# Patient Record
Sex: FEMALE | Race: BLACK OR AFRICAN AMERICAN | Employment: PART TIME | URBAN - METROPOLITAN AREA
[De-identification: names, ages, dates, MRNs, and addresses within clinical notes are randomized per-mention and may not be internally consistent; named-entity substitution may affect disease eponyms.]

---

## 2021-09-22 ENCOUNTER — HOSPITAL ENCOUNTER (EMERGENCY)
Facility: CLINIC | Age: 46
Discharge: HOME | End: 2021-09-22
Attending: FAMILY MEDICINE | Admitting: FAMILY MEDICINE
Payer: MEDICAID

## 2021-09-22 VITALS
DIASTOLIC BLOOD PRESSURE: 77 MMHG | WEIGHT: 178 LBS | TEMPERATURE: 97.4 F | SYSTOLIC BLOOD PRESSURE: 109 MMHG | RESPIRATION RATE: 12 BRPM | HEART RATE: 87 BPM | OXYGEN SATURATION: 100 %

## 2021-09-22 DIAGNOSIS — T23.261A PARTIAL THICKNESS BURN OF BACK OF RIGHT HAND, INITIAL ENCOUNTER: ICD-10-CM

## 2021-09-22 PROCEDURE — 99284 EMERGENCY DEPT VISIT MOD MDM: CPT | Performed by: FAMILY MEDICINE

## 2021-09-22 PROCEDURE — 16020 DRESS/DEBRID P-THICK BURN S: CPT | Performed by: FAMILY MEDICINE

## 2021-09-22 PROCEDURE — 99283 EMERGENCY DEPT VISIT LOW MDM: CPT | Mod: 25 | Performed by: FAMILY MEDICINE

## 2021-09-22 RX ORDER — LIRAGLUTIDE 6 MG/ML
1.2 INJECTION SUBCUTANEOUS DAILY
COMMUNITY

## 2021-09-22 RX ORDER — IBUPROFEN 200 MG
600 TABLET ORAL EVERY 6 HOURS PRN
Qty: 30 TABLET | Refills: 0 | Status: SHIPPED | OUTPATIENT
Start: 2021-09-22 | End: 2023-05-31

## 2021-09-22 RX ORDER — BACITRACIN ZINC 500 [USP'U]/G
OINTMENT TOPICAL 2 TIMES DAILY
Qty: 30 G | Refills: 0 | Status: SHIPPED | OUTPATIENT
Start: 2021-09-22 | End: 2023-05-31

## 2021-09-22 RX ORDER — IBUPROFEN 600 MG/1
600 TABLET, FILM COATED ORAL ONCE
Status: DISCONTINUED | OUTPATIENT
Start: 2021-09-22 | End: 2021-09-22 | Stop reason: HOSPADM

## 2021-09-22 RX ORDER — SIMVASTATIN 20 MG
20 TABLET ORAL AT BEDTIME
COMMUNITY
End: 2023-05-31

## 2021-09-22 NOTE — DISCHARGE INSTRUCTIONS
Thank you for choosing [facility].     Please closely monitor for further symptoms. Return to the Emergency Department if you develop any new or worsening signs or symptoms.    If you received any opiate pain medications or sedatives during your visit, please do not drive for at least 8 hours.     Labs, cultures or final xray interpretations may still need to be reviewed.  We will call you if your plan of care needs to be changed.    Please follow up with your primary care physician or clinic.

## 2021-09-22 NOTE — ED PROVIDER NOTES
[facility] EMERGENCY DEPARTMENT ([facility])       9/22/21  History     Chief Complaint   Patient presents with     Burn     right     The history is provided by the patient and medical records.     Lucía Lopez is a 46 year old otherwise healthy female who presents to the Emergency Department for evaluation of a burn to her right hand which she sustained on Friday (9/17). Patient accidentally spilled hot coffee on her hand. Her hand began hurting more after her blister opened. Per [facility] her last Tdap was in 2018.     History reviewed. No pertinent past medical history.    History reviewed. No pertinent surgical history.    History reviewed. No pertinent family history.    Social History     Tobacco Use     Smoking status: Never Smoker     Smokeless tobacco: Current User   Substance Use Topics     Alcohol use: Never       Current Facility-Administered Medications   Medication     ibuprofen (ADVIL/MOTRIN) tablet 600 mg     Current Outpatient Medications   Medication     bacitracin 500 UNIT/GM external ointment     ibuprofen (ADVIL/MOTRIN) 200 MG tablet     liraglutide (VICTOZA) 18 MG/3ML solution     metFORMIN (GLUCOPHAGE) 1000 MG tablet     simvastatin (ZOCOR) 20 MG tablet      No Known Allergies      I have reviewed the Medications, Allergies, Past Medical and Surgical History, and Social History in the Epic system.    Review of Systems  ROS: 14 point ROS neg other than the symptoms noted above in the HPI.      Physical Exam   BP: 109/77  Pulse: 87  Temp: 97.4  F (36.3  C)  Resp: 12  Weight: 80.7 kg (178 lb)  SpO2: 100 %      Physical Exam  Vitals and nursing note reviewed.   Constitutional:       General: She is not in acute distress.     Appearance: Normal appearance.   HENT:      Head: Normocephalic and atraumatic.      Nose: Nose normal.      Mouth/Throat:      Mouth: Mucous membranes are moist.   Cardiovascular:      Rate and Rhythm: Normal rate.   Pulmonary:      Effort: Pulmonary effort is normal.    Musculoskeletal:         General: Tenderness present.        Arms:    Skin:     General: Skin is warm and dry.   Neurological:      General: No focal deficit present.      Mental Status: She is alert.         ED Course     At 12:39 PM the patient was seen and examined by Jurgen Wilcox MD in Room ED07.        Procedures            The medical record was reviewed and interpreted.  Managed outpatient prescription medications.         No results found for this or any previous visit (from the past 24 hour(s)).  Medications   ibuprofen (ADVIL/MOTRIN) tablet 600 mg (has no administration in time range)             Assessments & Plan (with Medical Decision Making)   A 46-year-old woman who accidentally spilled hot coffee on her hand approximately 4 and half days ago, today blister fell off and she experienced increasing pain.  She has evidence of both first-degree and superficial second-degree burn to dorsum of the right hand but not overlying any joints and with no signs of infection or other complication.  Her tetanus immunization status is up-to-date.  The area was dressed with bacitracin and she is given prescriptions for bacitracin and ibuprofen as well as wound care instructions.  We discussed the indications for emergency department return and follow-up.  Stable for discharge.      I have reviewed the nursing notes.    I have reviewed the findings, diagnosis, plan and need for follow up with the patient.    New Prescriptions    BACITRACIN 500 UNIT/GM EXTERNAL OINTMENT    Apply topically 2 times daily    IBUPROFEN (ADVIL/MOTRIN) 200 MG TABLET    Take 3 tablets (600 mg) by mouth every 6 hours as needed for mild pain       Final diagnoses:   Partial thickness burn of back of right hand, initial encounter       Sara LICONA am serving as a trained medical scribe to document services personally performed by Jurgen Wilcox MD, based on the provider's statements to me.      IJurgen MD, was physically  present and have reviewed and verified the accuracy of this note documented by Sara Garcia.     Jurgen Wilcox  9/22/2021   [facility] EMERGENCY DEPARTMENT     Jurgen Wilcox MD  09/22/21 1901

## 2021-09-22 NOTE — LETTER
September 22, 2021      To Whom It May Concern:      Lucía Lopze was seen in our Emergency Department today, 09/22/21.  I expect her condition to improve over the next 1-2 days.  She may return to work/school when improved.    Sincerely,        Jurgen Wilcox MD

## 2021-09-22 NOTE — ED TRIAGE NOTES
Pt was warming coffee at home; poured hot coffee on hand accidentally (injury occurred 9/17); Pt has taken topical gel/aloe for burns

## 2022-11-01 NOTE — TELEPHONE ENCOUNTER
Action 11/1/22 Simpson  Phone #[phone]   Action Taken Called Simpson to push CT Chest    Resolved images

## 2022-11-02 ENCOUNTER — PRE VISIT (OUTPATIENT)
Dept: CARDIOLOGY | Facility: CLINIC | Age: 47
End: 2022-11-02

## 2022-11-02 ENCOUNTER — OFFICE VISIT (OUTPATIENT)
Dept: CARDIOLOGY | Facility: CLINIC | Age: 47
End: 2022-11-02
Attending: INTERNAL MEDICINE
Payer: MEDICAID

## 2022-11-02 VITALS
OXYGEN SATURATION: 99 % | BODY MASS INDEX: 31.47 KG/M2 | DIASTOLIC BLOOD PRESSURE: 85 MMHG | HEART RATE: 90 BPM | SYSTOLIC BLOOD PRESSURE: 125 MMHG | WEIGHT: 171 LBS | HEIGHT: 62 IN

## 2022-11-02 DIAGNOSIS — E78.2 MIXED HYPERLIPIDEMIA: ICD-10-CM

## 2022-11-02 DIAGNOSIS — I25.2 HISTORY OF HEART ATTACK: Primary | ICD-10-CM

## 2022-11-02 DIAGNOSIS — I25.10 CORONARY ARTERY DISEASE INVOLVING NATIVE CORONARY ARTERY OF NATIVE HEART WITHOUT ANGINA PECTORIS: ICD-10-CM

## 2022-11-02 DIAGNOSIS — R06.09 DYSPNEA ON EXERTION: ICD-10-CM

## 2022-11-02 DIAGNOSIS — E11.59 TYPE 2 DIABETES MELLITUS WITH OTHER CIRCULATORY COMPLICATION, WITHOUT LONG-TERM CURRENT USE OF INSULIN (H): ICD-10-CM

## 2022-11-02 LAB
ATRIAL RATE - MUSE: 90 BPM
DIASTOLIC BLOOD PRESSURE - MUSE: NORMAL MMHG
INTERPRETATION ECG - MUSE: NORMAL
P AXIS - MUSE: 65 DEGREES
PR INTERVAL - MUSE: 144 MS
QRS DURATION - MUSE: 76 MS
QT - MUSE: 354 MS
QTC - MUSE: 433 MS
R AXIS - MUSE: 60 DEGREES
SYSTOLIC BLOOD PRESSURE - MUSE: NORMAL MMHG
T AXIS - MUSE: 39 DEGREES
VENTRICULAR RATE- MUSE: 90 BPM

## 2022-11-02 PROCEDURE — G0463 HOSPITAL OUTPT CLINIC VISIT: HCPCS | Mod: 25

## 2022-11-02 PROCEDURE — 93005 ELECTROCARDIOGRAM TRACING: CPT

## 2022-11-02 PROCEDURE — 99204 OFFICE O/P NEW MOD 45 MIN: CPT | Performed by: INTERNAL MEDICINE

## 2022-11-02 ASSESSMENT — PAIN SCALES - GENERAL: PAINLEVEL: MILD PAIN (3)

## 2022-11-02 NOTE — LETTER
11/2/2022      RE: Lucía Lopez  [address] [place] [address]       Dear Colleague,    Thank you for the opportunity to participate in the care of your patient, Lucía Lopez, at the [facility]. Please see a copy of my visit note below.    I am delighted to see Lucía Lopez in consultation.The primary encounter diagnosis was History of heart attack. Diagnoses of Type 2 diabetes mellitus with other circulatory complication, without long-term current use of insulin (H), Coronary artery disease involving native coronary artery of native heart without angina pectoris, Mixed hyperlipidemia, and Dyspnea on exertion were also pertinent to this visit.   As you know, the patient is a 47 year old -American female. She   has a past medical history of Coronary artery disease, Diabetes (H), Hyperlipidemia, Obese, and Shortness of breath..    On this visit, the patient states that she has dyspnea with limited exercise.  The patient denies chest pressure/discomfort, palpitations, near-syncope, syncope, orthopnea, paroxysmal nocturnal dyspnea and lower extermity edema.    The patient's cardiovascular risk factors include known cardiac disease, high cholesterol, positive family history and diabetes mellitus.    The following portions of the patient's history were reviewed and updated as appropriate: allergies, current medications, past family history, past medical history, past social history, past surgical history, and the problem list.    PMH: The patient's past medical history includes:    Past Medical History:   Diagnosis Date     Coronary artery disease      Diabetes (H)      Hyperlipidemia      Obese      Shortness of breath       No past surgical history on file.    The patient's medications as of the current encounter are:     Current Outpatient Medications   Medication Sig Dispense Refill     aspirin (ASA) 81 MG EC tablet  Take 1 tablet (81 mg) by mouth daily 90 tablet 3     bacitracin 500 UNIT/GM external ointment Apply topically 2 times daily 30 g 0     cholecalciferol 50 MCG (2000 UT) CAPS Take 1 capsule by mouth daily       ibuprofen (ADVIL/MOTRIN) 200 MG tablet Take 3 tablets (600 mg) by mouth every 6 hours as needed for mild pain 30 tablet 0     liraglutide (VICTOZA) 18 MG/3ML solution Inject 1.2 mg Subcutaneous daily       metFORMIN (GLUCOPHAGE) 1000 MG tablet Take 1,000 mg by mouth 2 times daily (with meals)       simvastatin (ZOCOR) 20 MG tablet Take 20 mg by mouth At Bedtime       vitamin B-Complex Take 1 tablet by mouth daily         Labs:     No results found for any previous visit.       Allergies:  No Known Allergies    Family History:   Family History   Problem Relation Age of Onset     Diabetes Mother      Coronary Artery Disease Brother      Coronary Artery Disease Brother      Coronary Artery Disease Brother      Coronary Artery Disease Brother      No Known Problems Brother      Coronary Artery Disease Sister      Coronary Artery Disease Sister      Coronary Artery Disease Sister      No Known Problems Sister      No Known Problems Sister      No Known Problems Son      No Known Problems Son      No Known Problems Son      No Known Problems Daughter        Psychosocial history:  reports that she has been smoking cigarettes. She has never used smokeless tobacco. She reports that she does not drink alcohol and does not use drugs.    Review of systems: negative for, palpitations, exertional chest pain or pressure, paroxysmal nocturnal dyspnea, orthopnea, lower extremity edema, syncope or near-syncope and claudication    In addition,   General: No change in weight, sleep or appetite.  Normal energy.  No fever or chills  Eyes: Negative for vision changes or eye problems  ENT: No problems with ears, nose or throat.  No difficulty swallowing.  Resp: No coughing, wheezing or shortness of breath  GI: No nausea, vomiting,   "heartburn, abdominal pain, diarrhea, constipation or change in bowel habits  : No urinary frequency or dysuria, bladder or kidney problems  Musculoskeletal: No significant muscle or joint pains  Neurologic: No headaches, numbness, tingling, weakness, problems with balance or coordination  Psychiatric: No problems with anxiety, depression or mental health  Heme/immune/allergy: No history of bleeding or clotting problems or anemia.    Endocrine: Diabetes  Skin: No rashes,worrisome lesions or skin problems  Vascular:  No claudication, lifestyle limiting or otherwise; no ischemic rest pain; no non-healing ulcers. No weakness, No loss of sensation        Physical examination  Vitals: /85 (BP Location: Right arm, Patient Position: Supine, Cuff Size: Adult Large)   Pulse 90   Ht 1.565 m (5' 1.61\")   Wt 77.6 kg (171 lb)   SpO2 99%   BMI 31.67 kg/m    BMI= Body mass index is 31.67 kg/m .    In general, the patient is a pleasant female in no apparent distress.    HEENT: Normiocephalic and atraumatic.  PERRLA.  EOMI.  Sclerae white, not injected.  Nares clear.  Pharynx without erythema or exudate.  Dentition intact.    Neck: No adenopathy.  No thyromegaly. Carotids +2/2 bilaterally without bruits.  No jugular venous distension.   Heart:  The PMI is in the 5th ICS in the midclavicular line. There is no heave. Regular rate and rhythm. Normal S1, S2 splits physiologically. No murmur, rub, click, or gallop.    Lungs: Clear to asculation.  No ronchi, wheezes, rales.  No dullness to percussion.   Abdomen: Soft, nontender, nondistended. No organomegaly. No AAA.  No bruits.   Extremities: No clubbing, cyanosis, or edema. The pulses were intact bilaterally.   Neurological: The neurological examination reveal a patient who was oriented to person, place, and time.  The remainder of the examination was nonfocal.    Cardiac tests include:    Chest CT - significant coronary calcification  ECG - NSR, LAE, normal axis, intervals, " no Qs    Assessment and Plan    1. CAD/dyspnea - plan dobutamine echo  - add ASA  2. HL - on statin  3. DM - on GLP1 agonist and metformin    The patient is to return  In 1 month. The patient understood the treatment plan as outlined above.  Patient speaks Surinamese.  An  was provided.      Ky Sandoval MD

## 2022-11-02 NOTE — PROGRESS NOTES
I am delighted to see Lucía Lopez in consultation.The primary encounter diagnosis was History of heart attack. Diagnoses of Type 2 diabetes mellitus with other circulatory complication, without long-term current use of insulin (H), Coronary artery disease involving native coronary artery of native heart without angina pectoris, Mixed hyperlipidemia, and Dyspnea on exertion were also pertinent to this visit.   As you know, the patient is a 47 year old -American female. She   has a past medical history of Coronary artery disease, Diabetes (H), Hyperlipidemia, Obese, and Shortness of breath..    On this visit, the patient states that she has dyspnea with limited exercise.  The patient denies chest pressure/discomfort, palpitations, near-syncope, syncope, orthopnea, paroxysmal nocturnal dyspnea and lower extermity edema.    The patient's cardiovascular risk factors include known cardiac disease, high cholesterol, positive family history and diabetes mellitus.    The following portions of the patient's history were reviewed and updated as appropriate: allergies, current medications, past family history, past medical history, past social history, past surgical history, and the problem list.    PMH: The patient's past medical history includes:    Past Medical History:   Diagnosis Date     Coronary artery disease      Diabetes (H)      Hyperlipidemia      Obese      Shortness of breath       No past surgical history on file.    The patient's medications as of the current encounter are:     Current Outpatient Medications   Medication Sig Dispense Refill     aspirin (ASA) 81 MG EC tablet Take 1 tablet (81 mg) by mouth daily 90 tablet 3     bacitracin 500 UNIT/GM external ointment Apply topically 2 times daily 30 g 0     cholecalciferol 50 MCG (2000 UT) CAPS Take 1 capsule by mouth daily       ibuprofen (ADVIL/MOTRIN) 200 MG tablet Take 3 tablets (600 mg) by mouth every 6 hours as needed for mild pain 30 tablet 0      liraglutide (VICTOZA) 18 MG/3ML solution Inject 1.2 mg Subcutaneous daily       metFORMIN (GLUCOPHAGE) 1000 MG tablet Take 1,000 mg by mouth 2 times daily (with meals)       simvastatin (ZOCOR) 20 MG tablet Take 20 mg by mouth At Bedtime       vitamin B-Complex Take 1 tablet by mouth daily         Labs:     No results found for any previous visit.       Allergies:  No Known Allergies    Family History:   Family History   Problem Relation Age of Onset     Diabetes Mother      Coronary Artery Disease Brother      Coronary Artery Disease Brother      Coronary Artery Disease Brother      Coronary Artery Disease Brother      No Known Problems Brother      Coronary Artery Disease Sister      Coronary Artery Disease Sister      Coronary Artery Disease Sister      No Known Problems Sister      No Known Problems Sister      No Known Problems Son      No Known Problems Son      No Known Problems Son      No Known Problems Daughter        Psychosocial history:  reports that she has been smoking cigarettes. She has never used smokeless tobacco. She reports that she does not drink alcohol and does not use drugs.    Review of systems: negative for, palpitations, exertional chest pain or pressure, paroxysmal nocturnal dyspnea, orthopnea, lower extremity edema, syncope or near-syncope and claudication    In addition,   General: No change in weight, sleep or appetite.  Normal energy.  No fever or chills  Eyes: Negative for vision changes or eye problems  ENT: No problems with ears, nose or throat.  No difficulty swallowing.  Resp: No coughing, wheezing or shortness of breath  GI: No nausea, vomiting,  heartburn, abdominal pain, diarrhea, constipation or change in bowel habits  : No urinary frequency or dysuria, bladder or kidney problems  Musculoskeletal: No significant muscle or joint pains  Neurologic: No headaches, numbness, tingling, weakness, problems with balance or coordination  Psychiatric: No problems with anxiety,  "depression or mental health  Heme/immune/allergy: No history of bleeding or clotting problems or anemia.    Endocrine: Diabetes  Skin: No rashes,worrisome lesions or skin problems  Vascular:  No claudication, lifestyle limiting or otherwise; no ischemic rest pain; no non-healing ulcers. No weakness, No loss of sensation        Physical examination  Vitals: /85 (BP Location: Right arm, Patient Position: Supine, Cuff Size: Adult Large)   Pulse 90   Ht 1.565 m (5' 1.61\")   Wt 77.6 kg (171 lb)   SpO2 99%   BMI 31.67 kg/m    BMI= Body mass index is 31.67 kg/m .    In general, the patient is a pleasant female in no apparent distress.    HEENT: Normiocephalic and atraumatic.  PERRLA.  EOMI.  Sclerae white, not injected.  Nares clear.  Pharynx without erythema or exudate.  Dentition intact.    Neck: No adenopathy.  No thyromegaly. Carotids +2/2 bilaterally without bruits.  No jugular venous distension.   Heart:  The PMI is in the 5th ICS in the midclavicular line. There is no heave. Regular rate and rhythm. Normal S1, S2 splits physiologically. No murmur, rub, click, or gallop.    Lungs: Clear to asculation.  No ronchi, wheezes, rales.  No dullness to percussion.   Abdomen: Soft, nontender, nondistended. No organomegaly. No AAA.  No bruits.   Extremities: No clubbing, cyanosis, or edema. The pulses were intact bilaterally.   Neurological: The neurological examination reveal a patient who was oriented to person, place, and time.  The remainder of the examination was nonfocal.    Cardiac tests include:    Chest CT - significant coronary calcification  ECG - NSR, LAE, normal axis, intervals, no Qs    Assessment and Plan    1. CAD/dyspnea - plan dobutamine echo  - add ASA  2. HL - on statin  3. DM - on GLP1 agonist and metformin    The patient is to return  In 1 month. The patient understood the treatment plan as outlined above.  Patient speaks Norwegian.  An  was provided.      Ky Sandoval MD     "

## 2022-11-02 NOTE — PATIENT INSTRUCTIONS
"Cardiology Providers you saw during your visit:  Dr. Sandoval    Medication changes: None    Follow up:   Dobutamine stress echo  Follow up with Dr. Sandoval in 1 month  Nutrition referral ordered, they will contact you for scheduling    INSTRUCTIONS FOR DOBUTAMINE STRESS ECHO:     Nothing to eat or drink for 3 hours before test except for water.   No caffeine, tobacco, or alcohol for 12 hrs before test.     If you have any questions, contact  Jaylan Wilkerson RN. We are encouraging the use of RABBL to communicate with your HealthCare Provider     To contact the [facility], please call, [phone]  To schedule an appointment or to leave a message for your Care Team Press #1  If you are a physician calling for another physician Press #2  For Billing Press #3  For Medical Records Press #4\"    "

## 2022-11-02 NOTE — NURSING NOTE
Chief Complaint   Patient presents with     New Patient     New Cardiology- ED follow up possible hx of heart attack     Coronary Artery Disease     Hyperlipidemia     Diabetes     Vitals were taken, medications reconciled, and EKG was performed.    JAIR Huerta  7:27 AM

## 2022-11-02 NOTE — NURSING NOTE
No medication changes today.     Plan for Dobutamine stress echo. Procedure instructions reviewed with pt using  services, questions answered.     Follow up with Dr. Sandoval in 1 month to discuss results.     Nutrition referral ordered.     Jaylan Wilkerson RN   Cardiology Nurse Coordinator

## 2022-11-04 ENCOUNTER — HOSPITAL ENCOUNTER (OUTPATIENT)
Dept: CARDIOLOGY | Facility: CLINIC | Age: 47
Discharge: HOME | End: 2022-11-04
Attending: INTERNAL MEDICINE | Admitting: INTERNAL MEDICINE
Payer: MEDICAID

## 2022-11-04 VITALS — DIASTOLIC BLOOD PRESSURE: 75 MMHG | SYSTOLIC BLOOD PRESSURE: 132 MMHG

## 2022-11-04 DIAGNOSIS — E11.59 TYPE 2 DIABETES MELLITUS WITH OTHER CIRCULATORY COMPLICATION, WITHOUT LONG-TERM CURRENT USE OF INSULIN (H): ICD-10-CM

## 2022-11-04 DIAGNOSIS — I25.10 CORONARY ARTERY DISEASE INVOLVING NATIVE CORONARY ARTERY OF NATIVE HEART WITHOUT ANGINA PECTORIS: ICD-10-CM

## 2022-11-04 DIAGNOSIS — R06.09 DYSPNEA ON EXERTION: ICD-10-CM

## 2022-11-04 DIAGNOSIS — E78.2 MIXED HYPERLIPIDEMIA: ICD-10-CM

## 2022-11-04 PROCEDURE — 258N000003 HC RX IP 258 OP 636: Performed by: INTERNAL MEDICINE

## 2022-11-04 PROCEDURE — 93350 STRESS TTE ONLY: CPT | Mod: 26 | Performed by: INTERNAL MEDICINE

## 2022-11-04 PROCEDURE — 93018 CV STRESS TEST I&R ONLY: CPT | Performed by: INTERNAL MEDICINE

## 2022-11-04 PROCEDURE — 93321 DOPPLER ECHO F-UP/LMTD STD: CPT | Mod: TC

## 2022-11-04 PROCEDURE — 250N000011 HC RX IP 250 OP 636: Performed by: INTERNAL MEDICINE

## 2022-11-04 PROCEDURE — 250N000009 HC RX 250: Performed by: INTERNAL MEDICINE

## 2022-11-04 PROCEDURE — 999N000208 ECHO STRESS ECHOCARDIOGRAM

## 2022-11-04 PROCEDURE — 93016 CV STRESS TEST SUPVJ ONLY: CPT | Performed by: INTERNAL MEDICINE

## 2022-11-04 PROCEDURE — 93325 DOPPLER ECHO COLOR FLOW MAPG: CPT | Mod: 26 | Performed by: INTERNAL MEDICINE

## 2022-11-04 PROCEDURE — 93321 DOPPLER ECHO F-UP/LMTD STD: CPT | Mod: 26 | Performed by: INTERNAL MEDICINE

## 2022-11-04 PROCEDURE — 255N000002 HC RX 255 OP 636: Performed by: INTERNAL MEDICINE

## 2022-11-04 RX ORDER — ATROPINE SULFATE 0.4 MG/ML
.2-2 AMPUL (ML) INJECTION
Status: COMPLETED | OUTPATIENT
Start: 2022-11-04 | End: 2022-11-04

## 2022-11-04 RX ORDER — SODIUM CHLORIDE 9 MG/ML
INJECTION, SOLUTION INTRAVENOUS CONTINUOUS
Status: ACTIVE | OUTPATIENT
Start: 2022-11-04 | End: 2022-11-04

## 2022-11-04 RX ORDER — DOBUTAMINE HYDROCHLORIDE 200 MG/100ML
10-50 INJECTION INTRAVENOUS CONTINUOUS
Status: ACTIVE | OUTPATIENT
Start: 2022-11-04 | End: 2022-11-04

## 2022-11-04 RX ORDER — METOPROLOL TARTRATE 1 MG/ML
1-20 INJECTION, SOLUTION INTRAVENOUS
Status: ACTIVE | OUTPATIENT
Start: 2022-11-04 | End: 2022-11-04

## 2022-11-04 RX ADMIN — PERFLUTREN 7 ML: 6.52 INJECTION, SUSPENSION INTRAVENOUS at 14:39

## 2022-11-04 RX ADMIN — DOBUTAMINE 10 MCG/KG/MIN: 12.5 INJECTION, SOLUTION, CONCENTRATE INTRAVENOUS at 14:27

## 2022-11-04 RX ADMIN — ATROPINE SULFATE 0.8 MG: 0.4 INJECTION, SOLUTION INTRAVENOUS at 14:35

## 2022-11-04 RX ADMIN — METOPROLOL TARTRATE 5 MG: 5 INJECTION INTRAVENOUS at 14:38

## 2022-11-04 NOTE — PROGRESS NOTES
Pt here for dobutamine stress test.  Test, meds and side effects reviewed with patient through iPad [place] .  Achieved target HR at 30 mcg Dobutamine and a total of 0.8 mg IV atropine.  Gave a total of 5 mg IV Metoprolol to bring HR back to baseline.  Post monitoring complete and VSS.  Pt escorted out to the gold waiting room.

## 2022-11-04 NOTE — LETTER
2022      Yasmine Lopez  [address] [place] [address]        Dear ,    We are writing to inform you of your test results.    Your test results fall within the expected range(s) or remain unchanged from previous results.  Please continue with current treatment plan.    Resulted Orders   Dobutamine Stress Echocardiogram    Narrative    [id number]  [id number]  [id number]  [id number]^RAMIN^TORRES^ANISHA     [facility]  Echocardiography Laboratory  [address] [place] [id number]     Name: YASMINE LOPEZ  MRN: [id number]  : 1975  Study Date: 2022 02:07 PM  Age: 47 yrs  Gender: Female  Patient Location: [facility]  Reason For Study: Type 2 diabetes mellitus with other circulatory  complication, wi  Ordering Physician: TORRES FAUSTIN  Referring Physician: TORRES FAUSTIN  Performed By: Kenna Canseco     BSA: 1.8 m2  Height: 61 in  Weight: 171 lb  HR: 80  BP: 160/89 mmHg  ______________________________________________________________________________  Procedure  Stress Echo Dobutamine Adult. Contrast Definity.  ______________________________________________________________________________  Interpretation Summary  Normal, low-risk dobutamine echocardiogram.  The target heart rate was achieved. Normal heart rate and BP response to  dobutamine.  Normal biventricular size, thickness, and global systolic function at  baseline, LVEF=55-60%.  With dobutamine, LVEF increased to >70% and LV cavity size decreased  appropriately.  No regional wall motion abnormalities are present at rest or with dobutamine.  No angina was elicited.  No ECG evidence of ischemia.  No significant valvular abnormalities are noted on screening Doppler exam.  The aortic root and visualized ascending aorta are normal.  ______________________________________________________________________________  Stress  Target Heart Rate was achieved.  The patient did not exhibit any symptoms during  drug infusion.  The maximum dose of dobutamine was 30mcg/kg/min.  The maximum dose of atropine was 0.8mg.  The maximum dose of metoprolol was 5mg.     Stress Results                                       Maximum Predicted HR:   171 bpm             Target HR: 145 bpm        % Maximum Predicted HR: 87 %                           Stage DurationHeart Rate  BP   Dose                               (mm:ss)   (bpm)                      baseline  0:00      80    160/89 0.00                        peak    8:52      148   147/7530.00                          Stress Duration:   8:52 mm:ss                       Maximum Stress HR: 148 bpm     Contrast  Definity (NDC #30620-527-63) given intravenously. Patient was given 7ml  mixture of 1.5ml Definity and 8.5ml saline. 3 ml wasted. Definity Expiration  22 . Definity Lot # 1322 . IV start location R Forearm .  ______________________________________________________________________________  MMode/2D Measurements & Calculations  asc Aorta Diam: 2.4 cm     Doppler Measurements & Calculations  TR max leonardo: 191.1 cm/sec  TR max P.6 mmHg     ______________________________________________________________________________  Report approved by: Carlos HUNYH 2022 03:03 PM             If you have any questions or concerns, please call the clinic at [phone], option 1.       Sincerely,      Ky Sandoval MD

## 2022-11-21 ENCOUNTER — TELEPHONE (OUTPATIENT)
Dept: FAMILY MEDICINE | Facility: CLINIC | Age: 47
End: 2022-11-21

## 2022-11-21 NOTE — TELEPHONE ENCOUNTER
General Call    Contacts       Type Contact Phone/Fax    11/21/2022 12:53 PM CST Phone (Incoming) Lucía Lopez (Self) [phone] (H)        Reason for Call:  Pt calling because she has an appt on nutritional RN on 11/23/22 and does not know if her insurance will cover this appt.      What are your questions or concerns:  Pt attempted to call but was told only her clinic or provider could call     TC has attempted x 2 to call - wait times are very long.      Date of last appointment with provider: 11/3/22    Okay to leave a detailed message?: Yes at Home number on file [phone] (home)    Call taken on 11/21/22 at noon by МАРИНА Perez

## 2022-12-06 ENCOUNTER — OFFICE VISIT (OUTPATIENT)
Dept: CARDIOLOGY | Facility: CLINIC | Age: 47
End: 2022-12-06
Attending: INTERNAL MEDICINE
Payer: MEDICAID

## 2022-12-06 VITALS
WEIGHT: 166.2 LBS | HEART RATE: 81 BPM | DIASTOLIC BLOOD PRESSURE: 84 MMHG | SYSTOLIC BLOOD PRESSURE: 125 MMHG | HEIGHT: 60 IN | OXYGEN SATURATION: 100 % | BODY MASS INDEX: 32.63 KG/M2

## 2022-12-06 DIAGNOSIS — E78.2 MIXED HYPERLIPIDEMIA: ICD-10-CM

## 2022-12-06 DIAGNOSIS — I25.10 CORONARY ARTERY DISEASE INVOLVING NATIVE CORONARY ARTERY OF NATIVE HEART WITHOUT ANGINA PECTORIS: ICD-10-CM

## 2022-12-06 DIAGNOSIS — R29.898 WEAKNESS OF LEFT ARM: Primary | ICD-10-CM

## 2022-12-06 DIAGNOSIS — R06.09 DYSPNEA ON EXERTION: ICD-10-CM

## 2022-12-06 DIAGNOSIS — E11.59 TYPE 2 DIABETES MELLITUS WITH OTHER CIRCULATORY COMPLICATION, WITHOUT LONG-TERM CURRENT USE OF INSULIN (H): ICD-10-CM

## 2022-12-06 PROCEDURE — G0463 HOSPITAL OUTPT CLINIC VISIT: HCPCS

## 2022-12-06 PROCEDURE — 99213 OFFICE O/P EST LOW 20 MIN: CPT | Performed by: INTERNAL MEDICINE

## 2022-12-06 RX ORDER — ACETAMINOPHEN 325 MG/1
325-650 TABLET ORAL EVERY 6 HOURS PRN
COMMUNITY

## 2022-12-06 ASSESSMENT — PAIN SCALES - GENERAL: PAINLEVEL: NO PAIN (0)

## 2022-12-06 NOTE — PROGRESS NOTES
I am delighted to see Lucía Lopez in consultation.The primary encounter diagnosis was Weakness of left arm. Diagnoses of Type 2 diabetes mellitus with other circulatory complication, without long-term current use of insulin (H), Coronary artery disease involving native coronary artery of native heart without angina pectoris, Mixed hyperlipidemia, and Dyspnea on exertion were also pertinent to this visit.   As you know, the patient is a 47 year old -American female. She   has a past medical history of Coronary artery disease, Diabetes (H), Hyperlipidemia, Obese, and Shortness of breath..    On this visit, the patient states that she has left arm weakness that is intermittent.  The patient denies chest pressure/discomfort, dyspnea, palpitations, near-syncope, syncope, orthopnea, paroxysmal nocturnal dyspnea and lower extermity edema.    The patient's cardiovascular risk factors include known cardiac disease, high cholesterol and diabetes mellitus.    The following portions of the patient's history were reviewed and updated as appropriate: allergies, current medications, past family history, past medical history, past social history, past surgical history, and the problem list.    PMH: The patient's past medical history includes:    Past Medical History:   Diagnosis Date     Coronary artery disease      Diabetes (H)      Hyperlipidemia      Obese      Shortness of breath       History reviewed. No pertinent surgical history.    The patient's medications as of the current encounter are:     Current Outpatient Medications   Medication Sig Dispense Refill     acetaminophen (TYLENOL) 325 MG tablet Take 325-650 mg by mouth every 6 hours as needed for mild pain       aspirin (ASA) 81 MG EC tablet Take 1 tablet (81 mg) by mouth daily 90 tablet 3     cholecalciferol 50 MCG (2000 UT) CAPS Take 1 capsule by mouth daily       liraglutide (VICTOZA) 18 MG/3ML solution Inject 1.2 mg Subcutaneous daily       metFORMIN  (GLUCOPHAGE) 1000 MG tablet Take 1,000 mg by mouth 2 times daily (with meals)       simvastatin (ZOCOR) 20 MG tablet Take 20 mg by mouth At Bedtime       vitamin B-Complex Take 1 tablet by mouth daily       bacitracin 500 UNIT/GM external ointment Apply topically 2 times daily (Patient not taking: Reported on 12/6/2022) 30 g 0     ibuprofen (ADVIL/MOTRIN) 200 MG tablet Take 3 tablets (600 mg) by mouth every 6 hours as needed for mild pain (Patient not taking: Reported on 12/6/2022) 30 tablet 0       Labs:     Office Visit on 11/02/2022   Component Date Value Ref Range Status     Ventricular Rate 11/02/2022 90  BPM Final     Atrial Rate 11/02/2022 90  BPM Final     IA Interval 11/02/2022 144  ms Final     QRS Duration 11/02/2022 76  ms Final     QT 11/02/2022 354  ms Final     QTc 11/02/2022 433  ms Final     P Axis 11/02/2022 65  degrees Final     R AXIS 11/02/2022 60  degrees Final     T Axis 11/02/2022 39  degrees Final     Interpretation ECG 11/02/2022    Final                    Value:Sinus rhythm  Possible Left atrial enlargement  Borderline ECG  No previous ECGs available  Confirmed by MD DIAN, LAKISHA ([id number]) on 11/2/2022 1:50:58 PM         Allergies:  No Known Allergies    Family History:   Family History   Problem Relation Age of Onset     Diabetes Mother      Coronary Artery Disease Brother      Coronary Artery Disease Brother      Coronary Artery Disease Brother      Coronary Artery Disease Brother      No Known Problems Brother      Coronary Artery Disease Sister      Coronary Artery Disease Sister      Coronary Artery Disease Sister      No Known Problems Sister      No Known Problems Sister      No Known Problems Son      No Known Problems Son      No Known Problems Son      No Known Problems Daughter        Psychosocial history:  reports that she has quit smoking. Her smoking use included cigarettes. She has never used smokeless tobacco. She reports that she does not drink alcohol and does not use  "drugs.    Review of systems: negative for, palpitations, exertional chest pain or pressure, paroxysmal nocturnal dyspnea, dyspnea on exertion, orthopnea, lower extremity edema, syncope or near-syncope and claudication    In addition,   General: No change in weight, sleep or appetite.  Normal energy.  No fever or chills  Eyes: Negative for vision changes or eye problems  ENT: No problems with ears, nose or throat.  No difficulty swallowing.  Resp: No coughing, wheezing or shortness of breath  GI: No nausea, vomiting,  heartburn, abdominal pain, diarrhea, constipation or change in bowel habits  : No urinary frequency or dysuria, bladder or kidney problems  Musculoskeletal: No significant muscle or joint pains  Neurologic: No headaches, numbness, tingling, weakness, problems with balance or coordination  Psychiatric: No problems with anxiety, depression or mental health  Heme/immune/allergy: No history of bleeding or clotting problems or anemia.    Endocrine: Diabetes  Skin: No rashes,worrisome lesions or skin problems  Vascular:  No claudication, lifestyle limiting or otherwise; no ischemic rest pain; no non-healing ulcers. No weakness, No loss of sensation         Physical examination  Vitals: /84 (BP Location: Left arm, Patient Position: Chair, Cuff Size: Adult Regular)   Pulse 81   Ht 1.535 m (5' 0.43\")   Wt 75.4 kg (166 lb 3.2 oz)   SpO2 100%   BMI 32.00 kg/m    BMI= Body mass index is 32 kg/m .    In general, the patient is a pleasant female in no apparent distress.    HEENT: Normiocephalic and atraumatic.  PERRLA.  EOMI.  Sclerae white, not injected.  Nares clear.  Pharynx without erythema or exudate.  Dentition intact.    Neck: No adenopathy.  No thyromegaly. Carotids +2/2 bilaterally without bruits.  No jugular venous distension.   Heart:  The PMI is in the 5th ICS in the midclavicular line. There is no heave. Regular rate and rhythm. Normal S1, S2 splits physiologically. No murmur, rub, click, or " gallop.    Lungs: Clear to asculation.  No ronchi, wheezes, rales.  No dullness to percussion.   Abdomen: Soft, nontender, nondistended. No organomegaly. No AAA.  No bruits.   Extremities: No clubbing, cyanosis, or edema. The pulses were intact bilaterally.   Neurological: The neurological examination reveal a patient who was oriented to person, place, and time.  The remainder of the examination was nonfocal.  There was no demonstrable left arm weakness prox=distal    Cardiac tests include:    Stress test - negative for ischemia    Assessment and Plan     1. CAD - on ASA  2. HL - on statin  3. DM - on GLP1 agonist and metformin  4. Left arm weakness - referral to neurology     The patient is to return prn. The patient understood the treatment plan as outlined above.  Patient speaks Polish.  An  was provided.      Ky Sandoval MD

## 2022-12-06 NOTE — NURSING NOTE
Chief Complaint   Patient presents with     Follow Up      discuss stress test results     Vitals were taken and medications reconciled.    Scot Delgado, EMT  7:23 AM

## 2022-12-06 NOTE — PATIENT INSTRUCTIONS
Medication Changes & Instructions:      Results:      Follow up Appointment Information:        [address] [facility] [facility] [address] [facility]  Third [place] [address]      Thank you for allowing us to be a part of your care here at the [facility].      If you have questions or concerns please contact us at:  Cardiovascular Clinic  [facility]   [phone] Press #1 to send a message to your care team  On Call Cardiologist for after hours or on weekends: [phone]  option #4     *All co-payments are due at the time of services, if financial concerns are keeping you from attending scheduled clinic visits please contact our financial counselors at [phone] for further assistance.   * Please note that you will NOT receive a reminder call regarding your scheduled testing, reminder calls are for provider appointments only.  If you are scheduled for testing within the [facility] system you may receive a call regarding pre-registration for billing purposes only.**

## 2022-12-06 NOTE — LETTER
12/6/2022      RE: Lucía Lopez  [address] [place] [address]       Dear Colleague,    Thank you for the opportunity to participate in the care of your patient, Lucía Lopez, at the [facility]. Please see a copy of my visit note below.      I am delighted to see Lucía Lopez in consultation.The primary encounter diagnosis was Weakness of left arm. Diagnoses of Type 2 diabetes mellitus with other circulatory complication, without long-term current use of insulin (H), Coronary artery disease involving native coronary artery of native heart without angina pectoris, Mixed hyperlipidemia, and Dyspnea on exertion were also pertinent to this visit.   As you know, the patient is a 47 year old -American female. She   has a past medical history of Coronary artery disease, Diabetes (H), Hyperlipidemia, Obese, and Shortness of breath..    On this visit, the patient states that she has left arm weakness that is intermittent.  The patient denies chest pressure/discomfort, dyspnea, palpitations, near-syncope, syncope, orthopnea, paroxysmal nocturnal dyspnea and lower extermity edema.    The patient's cardiovascular risk factors include known cardiac disease, high cholesterol and diabetes mellitus.    The following portions of the patient's history were reviewed and updated as appropriate: allergies, current medications, past family history, past medical history, past social history, past surgical history, and the problem list.    PMH: The patient's past medical history includes:    Past Medical History:   Diagnosis Date     Coronary artery disease      Diabetes (H)      Hyperlipidemia      Obese      Shortness of breath       History reviewed. No pertinent surgical history.    The patient's medications as of the current encounter are:     Current Outpatient Medications   Medication Sig Dispense Refill     acetaminophen (TYLENOL)  325 MG tablet Take 325-650 mg by mouth every 6 hours as needed for mild pain       aspirin (ASA) 81 MG EC tablet Take 1 tablet (81 mg) by mouth daily 90 tablet 3     cholecalciferol 50 MCG (2000 UT) CAPS Take 1 capsule by mouth daily       liraglutide (VICTOZA) 18 MG/3ML solution Inject 1.2 mg Subcutaneous daily       metFORMIN (GLUCOPHAGE) 1000 MG tablet Take 1,000 mg by mouth 2 times daily (with meals)       simvastatin (ZOCOR) 20 MG tablet Take 20 mg by mouth At Bedtime       vitamin B-Complex Take 1 tablet by mouth daily       bacitracin 500 UNIT/GM external ointment Apply topically 2 times daily (Patient not taking: Reported on 12/6/2022) 30 g 0     ibuprofen (ADVIL/MOTRIN) 200 MG tablet Take 3 tablets (600 mg) by mouth every 6 hours as needed for mild pain (Patient not taking: Reported on 12/6/2022) 30 tablet 0       Labs:     Office Visit on 11/02/2022   Component Date Value Ref Range Status     Ventricular Rate 11/02/2022 90  BPM Final     Atrial Rate 11/02/2022 90  BPM Final     MS Interval 11/02/2022 144  ms Final     QRS Duration 11/02/2022 76  ms Final     QT 11/02/2022 354  ms Final     QTc 11/02/2022 433  ms Final     P Axis 11/02/2022 65  degrees Final     R AXIS 11/02/2022 60  degrees Final     T Axis 11/02/2022 39  degrees Final     Interpretation ECG 11/02/2022    Final                    Value:Sinus rhythm  Possible Left atrial enlargement  Borderline ECG  No previous ECGs available  Confirmed by MD DIAN, LAKISHA ([id number]) on 11/2/2022 1:50:58 PM         Allergies:  No Known Allergies    Family History:   Family History   Problem Relation Age of Onset     Diabetes Mother      Coronary Artery Disease Brother      Coronary Artery Disease Brother      Coronary Artery Disease Brother      Coronary Artery Disease Brother      No Known Problems Brother      Coronary Artery Disease Sister      Coronary Artery Disease Sister      Coronary Artery Disease Sister      No Known Problems Sister      No Known  "Problems Sister      No Known Problems Son      No Known Problems Son      No Known Problems Son      No Known Problems Daughter        Psychosocial history:  reports that she has quit smoking. Her smoking use included cigarettes. She has never used smokeless tobacco. She reports that she does not drink alcohol and does not use drugs.    Review of systems: negative for, palpitations, exertional chest pain or pressure, paroxysmal nocturnal dyspnea, dyspnea on exertion, orthopnea, lower extremity edema, syncope or near-syncope and claudication    In addition,   General: No change in weight, sleep or appetite.  Normal energy.  No fever or chills  Eyes: Negative for vision changes or eye problems  ENT: No problems with ears, nose or throat.  No difficulty swallowing.  Resp: No coughing, wheezing or shortness of breath  GI: No nausea, vomiting,  heartburn, abdominal pain, diarrhea, constipation or change in bowel habits  : No urinary frequency or dysuria, bladder or kidney problems  Musculoskeletal: No significant muscle or joint pains  Neurologic: No headaches, numbness, tingling, weakness, problems with balance or coordination  Psychiatric: No problems with anxiety, depression or mental health  Heme/immune/allergy: No history of bleeding or clotting problems or anemia.    Endocrine: Diabetes  Skin: No rashes,worrisome lesions or skin problems  Vascular:  No claudication, lifestyle limiting or otherwise; no ischemic rest pain; no non-healing ulcers. No weakness, No loss of sensation         Physical examination  Vitals: /84 (BP Location: Left arm, Patient Position: Chair, Cuff Size: Adult Regular)   Pulse 81   Ht 1.535 m (5' 0.43\")   Wt 75.4 kg (166 lb 3.2 oz)   SpO2 100%   BMI 32.00 kg/m    BMI= Body mass index is 32 kg/m .    In general, the patient is a pleasant female in no apparent distress.    HEENT: Normiocephalic and atraumatic.  PERRLA.  EOMI.  Sclerae white, not injected.  Nares clear.  Pharynx " without erythema or exudate.  Dentition intact.    Neck: No adenopathy.  No thyromegaly. Carotids +2/2 bilaterally without bruits.  No jugular venous distension.   Heart:  The PMI is in the 5th ICS in the midclavicular line. There is no heave. Regular rate and rhythm. Normal S1, S2 splits physiologically. No murmur, rub, click, or gallop.    Lungs: Clear to asculation.  No ronchi, wheezes, rales.  No dullness to percussion.   Abdomen: Soft, nontender, nondistended. No organomegaly. No AAA.  No bruits.   Extremities: No clubbing, cyanosis, or edema. The pulses were intact bilaterally.   Neurological: The neurological examination reveal a patient who was oriented to person, place, and time.  The remainder of the examination was nonfocal.  There was no demonstrable left arm weakness prox=distal    Cardiac tests include:    Stress test - negative for ischemia    Assessment and Plan     1. CAD - on ASA  2. HL - on statin  3. DM - on GLP1 agonist and metformin  4. Left arm weakness - referral to neurology     The patient is to return prn. The patient understood the treatment plan as outlined above.  Patient speaks Italian.  An  was provided.      Ky Sandoval MD

## 2023-05-30 PROBLEM — E78.5 DYSLIPIDEMIA: Status: ACTIVE | Noted: 2023-05-12

## 2023-05-31 ENCOUNTER — OFFICE VISIT (OUTPATIENT)
Dept: NEUROLOGY | Facility: CLINIC | Age: 48
End: 2023-05-31
Payer: MEDICAID

## 2023-05-31 VITALS
WEIGHT: 164 LBS | DIASTOLIC BLOOD PRESSURE: 63 MMHG | SYSTOLIC BLOOD PRESSURE: 100 MMHG | HEART RATE: 95 BPM | BODY MASS INDEX: 32.2 KG/M2 | HEIGHT: 60 IN

## 2023-05-31 DIAGNOSIS — G44.229 CHRONIC TENSION-TYPE HEADACHE, NOT INTRACTABLE: ICD-10-CM

## 2023-05-31 DIAGNOSIS — G56.01 CARPAL TUNNEL SYNDROME ON RIGHT: Primary | ICD-10-CM

## 2023-05-31 PROCEDURE — 99205 OFFICE O/P NEW HI 60 MIN: CPT | Performed by: PSYCHIATRY & NEUROLOGY

## 2023-05-31 RX ORDER — ATORVASTATIN CALCIUM 40 MG/1
TABLET, FILM COATED ORAL
COMMUNITY
Start: 2023-04-27

## 2023-05-31 RX ORDER — CLOPIDOGREL BISULFATE 75 MG/1
TABLET ORAL
COMMUNITY
Start: 2023-04-27

## 2023-05-31 RX ORDER — VENLAFAXINE HYDROCHLORIDE 37.5 MG/1
37.5 CAPSULE, EXTENDED RELEASE ORAL DAILY
Qty: 30 CAPSULE | Refills: 6 | Status: SHIPPED | OUTPATIENT
Start: 2023-05-31

## 2023-05-31 NOTE — LETTER
2023         RE: Lucía Lopez  [address] [place] [address]        Dear Colleague,    Thank you for referring your patient, Lucía Lopez, to the [facility] [place]. Please see a copy of my visit note below.    NEUROLOGY CONSULTATION NOTE       [facility] [place]  [address] [place] [address]  Tel: [phone]  Fax: [phone]  [email or web address]     Lucía Lopez,  1975, MRN [id number]  PCP: [facility]  Date: 2023     ASSESSMENT & PLAN     Visit Diagnosis  1. Chronic tension type headache  2. Carpal tunnel syndrome     Chronic tension type headache  47-year-old female with history of HLD, DM2 and CAD who was referred for evaluation of progressively worsening headaches.  She had a CT of the head last year that was normal.  Her history suggest the diagnosis of tension type headache and I have recommended taking Effexor 37.5 mg daily as a prophylactic agent.  She will continue to use Tylenol for abortive treatment but I cautioned her to minimize the use.    Carpal tunnel syndrome  Although referral order mentions left arm weakness, patient denies any weakness and is not concerned about her left arm weakness.  She has some soft findings to suggest carpal tunnel syndrome and I have recommended EMG of bilateral upper extremities.  Follow-up will be the day she gets EMG    Thank you again for this referral, please feel free to contact me if you have any questions.    John Avilez MD  [facility]  (Formerly, [facility]     REASON FOR CONSULTATION Extremity Weakness and Headache        HISTORY OF PRESENT ILLNESS     We have been requested by [facility] made by to evaluate Lucía Lopez who is a 47 year old  female for left arm weakness    Patient is a 47-year-old with history of CAD, HLD, DM 2 who was referred for evaluation of 5 to 6  months history of headaches and questionable left arm weakness.  Although the referral order mentions left arm weakness patient does not think she has any weakness and is not concerned about that symptom.  She reports about 5 to 6 months ago she started having headaches that are bitemporal and not associated with nausea vomiting photophobia and phonophobia.  She had no weakness associated with that.  Surprisingly she reports after she started taking vitamin her headaches improved.  Previously she was using increased amount of Tylenol to treat her headaches but now her headaches have improved and she does not need to take medicines as frequently.  She gives a vague history of left arm weakness that at times present on the right side also and tends to bother her mostly at night.  There is no history of any neck pain or any pain radiating down into her left.  There is no history of weakness numbness in the lower extremities.     PROBLEM LIST   Patient Active Problem List   Diagnosis Code     Type 2 diabetes mellitus with other circulatory complication, without long-term current use of insulin (H) E11.59     Coronary artery disease involving native coronary artery of native heart without angina pectoris I25.10     Dyspnea on exertion R06.09     Weakness of left arm R29.898     Dyslipidemia E78.5         PAST MEDICAL & SURGICAL HISTORY     Past Medical History:   Patient  has a past medical history of Coronary artery disease, Diabetes (H), Hyperlipidemia, Obese, and Shortness of breath.    Surgical History:  She  has no past surgical history on file.     SOCIAL HISTORY     Reviewed, and she  reports that she has quit smoking. Her smoking use included cigarettes. She has never used smokeless tobacco. She reports that she does not drink alcohol and does not use drugs.     FAMILY HISTORY     Reviewed, and family history includes Coronary Artery Disease in her brother, brother, brother, brother, sister, sister, and sister;  Diabetes in her mother; No Known Problems in her brother, daughter, sister, sister, son, son, and son.     ALLERGIES     No Known Allergies      REVIEW OF SYSTEMS     A 12 point review of system was performed and was negative except as outlined in the history of present illness.     HOME MEDICATIONS     Current Outpatient Rx   Medication Sig Dispense Refill     acetaminophen (TYLENOL) 325 MG tablet Take 325-650 mg by mouth every 6 hours as needed for mild pain       aspirin (ASA) 81 MG EC tablet Take 1 tablet (81 mg) by mouth daily 90 tablet 3     atorvastatin (LIPITOR) 40 MG tablet        cholecalciferol 50 MCG (2000 UT) CAPS Take 1 capsule by mouth daily       clopidogrel (PLAVIX) 75 MG tablet        liraglutide (VICTOZA) 18 MG/3ML solution Inject 1.2 mg Subcutaneous daily       metFORMIN (GLUCOPHAGE) 1000 MG tablet Take 1,000 mg by mouth 2 times daily (with meals)       MULTIPLE VITAMIN PO        venlafaxine (EFFEXOR XR) 37.5 MG 24 hr capsule Take 1 capsule (37.5 mg) by mouth daily 30 capsule 6     vitamin B-Complex Take 1 tablet by mouth daily           PHYSICAL EXAM     Vital signs  /63 (BP Location: Right arm, Patient Position: Sitting)   Pulse 95   Ht 1.524 m (5')   Wt 74.4 kg (164 lb)   BMI 32.03 kg/m      Weight:   164 lbs 0 oz    Patient is alert and oriented x4 in no acute distress. Vital signs were reviewed and are documented in electronic medical record. Neck was supple, no carotid bruits, thyromegaly, JVD, or lymphadenopathy was noted.   NEUROLOGY EXAM:    Patient s speech was normal with no aphasia or dysarthria. Mentation, and affect were also normal.     Funduscopic exam was normal, with normal cup to disc ratio. Cranial nerves II -XII were intact.     Patient had normal mass, tone and motor strength was 5/5 in all extremities without pronator drift.     Sensation was intact to light touch, pinprick, and vibratory sensation.     Reflexes were 1+ symmetrical with downgoing toes.  She has a  positive Ciera sign on the right    No dysmetria noted on FNF or HKS. Romberg was negative.    Gait testing was normal. Able to walk on toes/heels. Tandem walk normal.     PERTINENT DIAGNOSTIC STUDIES     Following studies were reviewed:     CT BRAIN 10/31/2022  Normal head CT       PERTINENT LABS  Following labs were reviewed:  No visits with results within 3 Month(s) from this visit.   Latest known visit with results is:   Office Visit on 11/02/2022   Component Date Value     Ventricular Rate 11/02/2022 90      Atrial Rate 11/02/2022 90      MI Interval 11/02/2022 144      QRS Duration 11/02/2022 76      QT 11/02/2022 354      QTc 11/02/2022 433      P Axis 11/02/2022 65      R AXIS 11/02/2022 60      T Axis 11/02/2022 39      Interpretation ECG 11/02/2022                      Value:Sinus rhythm  Possible Left atrial enlargement  Borderline ECG  No previous ECGs available  Confirmed by MD BIGGS DAVID ([id number]) on 11/2/2022 1:50:58 PM          Total time spent for face to face visit, reviewing labs/imaging studies, counseling and coordination of care was: 1 Hour spent on the date of the encounter doing chart review, review of outside records, review of test results, interpretation of tests, patient visit and documentation       This note was dictated using voice recognition software.  Any grammatical or context distortions are unintentional and inherent to the software.    Orders Placed This Encounter   Procedures     EMG      New Prescriptions    VENLAFAXINE (EFFEXOR XR) 37.5 MG 24 HR CAPSULE    Take 1 capsule (37.5 mg) by mouth daily      Modified Medications    No medications on file              Again, thank you for allowing me to participate in the care of your patient.        Sincerely,        John Avilez MD

## 2023-05-31 NOTE — NURSING NOTE
Chief Complaint   Patient presents with     Extremity Weakness     LUE weakness- patient reports lasted a few days      Headache     Yoselin Cuevas CMA on 5/31/2023 at 11:58 AM

## 2023-05-31 NOTE — PROGRESS NOTES
NEUROLOGY CONSULTATION NOTE       [facility] NEUROLOGY [place]  [address] [place] [address]  Tel: [phone]  Fax: [phone]  [email or web address]     Lucía Lopez,  1975, MRN [id number]  PCP: [facility]  Date: 2023     ASSESSMENT & PLAN     Visit Diagnosis  1. Chronic tension type headache  2. Carpal tunnel syndrome     Chronic tension type headache  47-year-old female with history of HLD, DM2 and CAD who was referred for evaluation of progressively worsening headaches.  She had a CT of the head last year that was normal.  Her history suggest the diagnosis of tension type headache and I have recommended taking Effexor 37.5 mg daily as a prophylactic agent.  She will continue to use Tylenol for abortive treatment but I cautioned her to minimize the use.    Carpal tunnel syndrome  Although referral order mentions left arm weakness, patient denies any weakness and is not concerned about her left arm weakness.  She has some soft findings to suggest carpal tunnel syndrome and I have recommended EMG of bilateral upper extremities.  Follow-up will be the day she gets EMG    Thank you again for this referral, please feel free to contact me if you have any questions.    Jonh Avilez MD  [facility]  (Former[facility], [facility]     REASON FOR CONSULTATION Extremity Weakness and Headache        HISTORY OF PRESENT ILLNESS     We have been requested by [facility] made by to evaluate Lucía oLpez who is a 47 year old  female for left arm weakness    Patient is a 47-year-old with history of CAD, HLD, DM 2 who was referred for evaluation of 5 to 6 months history of headaches and questionable left arm weakness.  Although the referral order mentions left arm weakness patient does not think she has any weakness and is not concerned about that symptom.  She reports about 5 to 6 months ago she started  having headaches that are bitemporal and not associated with nausea vomiting photophobia and phonophobia.  She had no weakness associated with that.  Surprisingly she reports after she started taking vitamin her headaches improved.  Previously she was using increased amount of Tylenol to treat her headaches but now her headaches have improved and she does not need to take medicines as frequently.  She gives a vague history of left arm weakness that at times present on the right side also and tends to bother her mostly at night.  There is no history of any neck pain or any pain radiating down into her left.  There is no history of weakness numbness in the lower extremities.     PROBLEM LIST   Patient Active Problem List   Diagnosis Code     Type 2 diabetes mellitus with other circulatory complication, without long-term current use of insulin (H) E11.59     Coronary artery disease involving native coronary artery of native heart without angina pectoris I25.10     Dyspnea on exertion R06.09     Weakness of left arm R29.898     Dyslipidemia E78.5         PAST MEDICAL & SURGICAL HISTORY     Past Medical History:   Patient  has a past medical history of Coronary artery disease, Diabetes (H), Hyperlipidemia, Obese, and Shortness of breath.    Surgical History:  She  has no past surgical history on file.     SOCIAL HISTORY     Reviewed, and she  reports that she has quit smoking. Her smoking use included cigarettes. She has never used smokeless tobacco. She reports that she does not drink alcohol and does not use drugs.     FAMILY HISTORY     Reviewed, and family history includes Coronary Artery Disease in her brother, brother, brother, brother, sister, sister, and sister; Diabetes in her mother; No Known Problems in her brother, daughter, sister, sister, son, son, and son.     ALLERGIES     No Known Allergies      REVIEW OF SYSTEMS     A 12 point review of system was performed and was negative except as outlined in the  history of present illness.     HOME MEDICATIONS     Current Outpatient Rx   Medication Sig Dispense Refill     acetaminophen (TYLENOL) 325 MG tablet Take 325-650 mg by mouth every 6 hours as needed for mild pain       aspirin (ASA) 81 MG EC tablet Take 1 tablet (81 mg) by mouth daily 90 tablet 3     atorvastatin (LIPITOR) 40 MG tablet        cholecalciferol 50 MCG (2000 UT) CAPS Take 1 capsule by mouth daily       clopidogrel (PLAVIX) 75 MG tablet        liraglutide (VICTOZA) 18 MG/3ML solution Inject 1.2 mg Subcutaneous daily       metFORMIN (GLUCOPHAGE) 1000 MG tablet Take 1,000 mg by mouth 2 times daily (with meals)       MULTIPLE VITAMIN PO        venlafaxine (EFFEXOR XR) 37.5 MG 24 hr capsule Take 1 capsule (37.5 mg) by mouth daily 30 capsule 6     vitamin B-Complex Take 1 tablet by mouth daily           PHYSICAL EXAM     Vital signs  /63 (BP Location: Right arm, Patient Position: Sitting)   Pulse 95   Ht 1.524 m (5')   Wt 74.4 kg (164 lb)   BMI 32.03 kg/m      Weight:   164 lbs 0 oz    Patient is alert and oriented x4 in no acute distress. Vital signs were reviewed and are documented in electronic medical record. Neck was supple, no carotid bruits, thyromegaly, JVD, or lymphadenopathy was noted.   NEUROLOGY EXAM:    Patient s speech was normal with no aphasia or dysarthria. Mentation, and affect were also normal.     Funduscopic exam was normal, with normal cup to disc ratio. Cranial nerves II -XII were intact.     Patient had normal mass, tone and motor strength was 5/5 in all extremities without pronator drift.     Sensation was intact to light touch, pinprick, and vibratory sensation.     Reflexes were 1+ symmetrical with downgoing toes.  She has a positive [place] sign on the right    No dysmetria noted on FNF or HKS. Romberg was negative.    Gait testing was normal. Able to walk on toes/heels. Tandem walk normal.     PERTINENT DIAGNOSTIC STUDIES     Following studies were reviewed:     CT  BRAIN 10/31/2022  Normal head CT       PERTINENT LABS  Following labs were reviewed:  No visits with results within 3 Month(s) from this visit.   Latest known visit with results is:   Office Visit on 11/02/2022   Component Date Value     Ventricular Rate 11/02/2022 90      Atrial Rate 11/02/2022 90      WV Interval 11/02/2022 144      QRS Duration 11/02/2022 76      QT 11/02/2022 354      QTc 11/02/2022 433      P Axis 11/02/2022 65      R AXIS 11/02/2022 60      T Axis 11/02/2022 39      Interpretation ECG 11/02/2022                      Value:Sinus rhythm  Possible Left atrial enlargement  Borderline ECG  No previous ECGs available  Confirmed by MD DIAN, LAKISHA ([id number]) on 11/2/2022 1:50:58 PM          Total time spent for face to face visit, reviewing labs/imaging studies, counseling and coordination of care was: 1 Hour spent on the date of the encounter doing chart review, review of outside records, review of test results, interpretation of tests, patient visit and documentation       This note was dictated using voice recognition software.  Any grammatical or context distortions are unintentional and inherent to the software.    Orders Placed This Encounter   Procedures     EMG      New Prescriptions    VENLAFAXINE (EFFEXOR XR) 37.5 MG 24 HR CAPSULE    Take 1 capsule (37.5 mg) by mouth daily      Modified Medications    No medications on file

## 2023-08-18 ENCOUNTER — OFFICE VISIT (OUTPATIENT)
Dept: NUTRITION | Facility: CLINIC | Age: 48
End: 2023-08-18
Attending: INTERNAL MEDICINE
Payer: MEDICAID

## 2023-08-18 DIAGNOSIS — E11.59 TYPE 2 DIABETES MELLITUS WITH OTHER CIRCULATORY COMPLICATION, WITHOUT LONG-TERM CURRENT USE OF INSULIN (H): ICD-10-CM

## 2023-08-18 PROCEDURE — 97802 MEDICAL NUTRITION INDIV IN: CPT | Performed by: DIETITIAN, REGISTERED

## 2023-08-18 NOTE — PROGRESS NOTES
Medical Nutrition Therapy for Diabetes  Visit Type:Initial assessment and intervention  Lucía Lopez presents today for MNT and education related to type 2 diabetes.   She is accompanied by self.   Sri Lankan  #[id number] used for the duration of the visit  ASSESSMENT:   Patient comments/concerns relating to nutrition: Patient requesting to discuss food/meal planning for individuals with diabetes.   Patient arrived nearly 20 minutes late for the visit which limited visit duration.  Despite multiple attempts by writer, patient declined to provide more of a history than writer captured below and insisted writer educate her on the recommend meal plan.   Introduced carbohydrate counting, label reading, and the Plate method.   NUTRITION HISTORY:  Breakfast: coffee with milk, whole wheat bread (1-2) with peanut butter  Lunch: rice with sauce (gravy, aguilera)   Dinner:   Snacks:   Beverages:     Misses meals?   Eats out:       Previous diet education:       Food allergies/intolerances:     Diet is high in: unable to assess as patient declined to provide complete nutrition history  Diet is low in: unable to assess as patient declined to provide complete nutrition history    EXERCISE:     SOCIO/ECONOMIC:   Lives with:     BLOOD GLUCOSE MONITORING:  Patient glucose self monitoring as follows: .   BG meter:  meter  BG results:      BG values are:   Patient's most recent A1C: 7.4% (3/27/23)    MEDICATIONS:  Current Outpatient Medications   Medication    acetaminophen (TYLENOL) 325 MG tablet    aspirin (ASA) 81 MG EC tablet    atorvastatin (LIPITOR) 40 MG tablet    cholecalciferol 50 MCG (2000 UT) CAPS    clopidogrel (PLAVIX) 75 MG tablet    liraglutide (VICTOZA) 18 MG/3ML solution    metFORMIN (GLUCOPHAGE) 1000 MG tablet    MULTIPLE VITAMIN PO    venlafaxine (EFFEXOR XR) 37.5 MG 24 hr capsule    vitamin B-Complex     No current facility-administered medications for this visit.       LABS:  No results found for: A1C  No  results found for: GLC  No results found for: LDL  No results found for: HDL]  No results found for: GFRESTIMATED  No results found for: CR  No results found for: MICROALBUMIN    ANTHROPOMETRICS:  Vitals: There were no vitals taken for this visit.  There is no height or weight on file to calculate BMI.      Wt Readings from Last 5 Encounters:   05/31/23 74.4 kg (164 lb)   12/06/22 75.4 kg (166 lb 3.2 oz)   11/02/22 77.6 kg (171 lb)   09/22/21 80.7 kg (178 lb)     Weight Change:     ESTIMATED KCAL REQUIREMENTS:  1301-2705 kcal per day (for weight loss)   *may need to be adjusted based on physical activity (as patient did not report usual activity today calculated using activity factors of 1.2 -1.375)    NUTRITION DIAGNOSIS: Food- and nutrition-related knowledge deficit related to ack of prior exposure to accurate nutritionrelated information as evidenced by patient requesting education on eating with diabetes diagnosis    NUTRITION INTERVENTION:  Education given to support: general nutrition guidelines, weight reduction, carb counting, labeling, portion control, and heart healthy diet  Education Materials Provided: My Plate Planner/Choose My Plate, Label Reading, Guide to Carb Counting, and Carb Counting and Eating Well: General Info (Chinese)    PATIENT'S BEHAVIOR CHANGE GOALS:   See Patient Instructions for patient stated behavior change goals. AVS was printed and given to patient at today's appointment.    MONITOR / EVALUATE:  RD will monitor/evaluate:  Blood Glucose / A1c  Food / Beverage / Nutrient intake   Pertinent Labs    FOLLOW-UP:  Call RD with questions/concerns.   Follow-up appointment scheduled on 10/6/23.     Annie Vivar, MPH, RD, CDCES, LD 8/18/2023    Time spent in minutes: 41  Encounter: Individual

## 2023-08-18 NOTE — PATIENT INSTRUCTIONS
Meal Planning  -aim for 30-45 grams carbohydrate per meal  -up to 15 grams carbohydrate per snack     -review the handout on the Plate method    2.  Follow up with Annie on  at 1 PM at the [facility].     3.  Please call or send a RumbleTalk message with any questions or concerns.    Annie Vivar, MPH, RD, JERZY LD  Diabetes Education Triage Line: [phone]  Diabetes Education Appointment [phone]

## 2023-08-18 NOTE — LETTER
8/18/2023         RE: Lucía Lopez  [address] [place] [address]        Dear Colleague,    Thank you for referring your patient, Lucía Lopez, to the [facility]. Please see a copy of my visit note below.    Medical Nutrition Therapy for Diabetes  Visit Type:Initial assessment and intervention  Lucía Lopez presents today for MNT and education related to type 2 diabetes.   She is accompanied by self.   Maltese  #[id number] used for the duration of the visit  ASSESSMENT:   Patient comments/concerns relating to nutrition: Patient requesting to discuss food/meal planning for individuals with diabetes.   Patient arrived nearly 20 minutes late for the visit which limited visit duration.  Despite multiple attempts by writer, patient declined to provide more of a history than writer captured below and insisted writer educate her on the recommend meal plan.   Introduced carbohydrate counting, label reading, and the Plate method.   NUTRITION HISTORY:  Breakfast: coffee with milk, whole wheat bread (1-2) with peanut butter  Lunch: rice with sauce (gravy, aguilera)   Dinner:   Snacks:   Beverages:     Misses meals?   Eats out:       Previous diet education:       Food allergies/intolerances:     Diet is high in: unable to assess as patient declined to provide complete nutrition history  Diet is low in: unable to assess as patient declined to provide complete nutrition history    EXERCISE:     SOCIO/ECONOMIC:   Lives with:     BLOOD GLUCOSE MONITORING:  Patient glucose self monitoring as follows: .   BG meter:  meter  BG results:      BG values are:   Patient's most recent A1C: 7.4% (3/27/23)    MEDICATIONS:  Current Outpatient Medications   Medication    acetaminophen (TYLENOL) 325 MG tablet    aspirin (ASA) 81 MG EC tablet    atorvastatin (LIPITOR) 40 MG tablet    cholecalciferol 50 MCG (2000 UT) CAPS    clopidogrel (PLAVIX) 75 MG tablet    liraglutide (VICTOZA) 18 MG/3ML solution     metFORMIN (GLUCOPHAGE) 1000 MG tablet    MULTIPLE VITAMIN PO    venlafaxine (EFFEXOR XR) 37.5 MG 24 hr capsule    vitamin B-Complex     No current facility-administered medications for this visit.       LABS:  No results found for: A1C  No results found for: GLC  No results found for: LDL  No results found for: HDL]  No results found for: GFRESTIMATED  No results found for: CR  No results found for: MICROALBUMIN    ANTHROPOMETRICS:  Vitals: There were no vitals taken for this visit.  There is no height or weight on file to calculate BMI.      Wt Readings from Last 5 Encounters:   05/31/23 74.4 kg (164 lb)   12/06/22 75.4 kg (166 lb 3.2 oz)   11/02/22 77.6 kg (171 lb)   09/22/21 80.7 kg (178 lb)     Weight Change:     ESTIMATED KCAL REQUIREMENTS:  7909-9422 kcal per day (for weight loss)   *may need to be adjusted based on physical activity (as patient did not report usual activity today calculated using activity factors of 1.2 -1.375)    NUTRITION DIAGNOSIS: Food- and nutrition-related knowledge deficit related to ack of prior exposure to accurate nutritionrelated information as evidenced by patient requesting education on eating with diabetes diagnosis    NUTRITION INTERVENTION:  Education given to support: general nutrition guidelines, weight reduction, carb counting, labeling, portion control, and heart healthy diet  Education Materials Provided: My Plate Planner/Choose My Plate, Label Reading, Guide to Carb Counting, and Carb Counting and Eating Well: General Info (Ukrainian)    PATIENT'S BEHAVIOR CHANGE GOALS:   See Patient Instructions for patient stated behavior change goals. AVS was printed and given to patient at today's appointment.    MONITOR / EVALUATE:  RD will monitor/evaluate:  Blood Glucose / A1c  Food / Beverage / Nutrient intake   Pertinent Labs    FOLLOW-UP:  Call RD with questions/concerns.   Follow-up appointment scheduled on 10/6/23.     Annie Vivar, MPH, RD, CDCES, LD 8/18/2023    Time spent in  minutes: 41  Encounter: Individual

## 2023-09-11 ENCOUNTER — NURSE TRIAGE (OUTPATIENT)
Dept: NURSING | Facility: CLINIC | Age: 48
End: 2023-09-11

## 2023-09-11 NOTE — TELEPHONE ENCOUNTER
Patient and friend called.    Patient is a diabetic and out of insulin and has no insurance.  Patient has been trying to get ahold of her [facility]  but with no luck.  Patient is needing insulin and not knowing what to do.    I explained that the patient is seen through [facility].  I advised them to call their nurse line to get a request for a refill.    Gave them the number to the [facility] nurse line .  They will call now.    Carlene Moreno RN   09/11/23 4:30 PM  [facility] Nurse Advisor  Reason for Disposition   [1] Prescription refill request for ESSENTIAL medicine (i.e., likelihood of harm to patient if not taken) AND [2] triager unable to refill per department policy    Additional Information   Negative: New-onset or worsening symptoms, see that guideline (e.g., diarrhea, runny nose, sore throat)   Negative: Medicine question not related to refill or renewal   Negative: Caller (e.g., patient or pharmacist) requesting information about a new medicine   Negative: Caller requesting information unrelated to medicine    Protocols used: Medication Refill and Renewal Call-A-

## 2023-09-22 PROBLEM — E55.9 VITAMIN D DEFICIENCY: Status: ACTIVE | Noted: 2023-09-22

## 2023-10-06 ENCOUNTER — TELEPHONE (OUTPATIENT)
Dept: EDUCATION SERVICES | Facility: CLINIC | Age: 48
End: 2023-10-06

## 2023-10-06 NOTE — TELEPHONE ENCOUNTER
Patient was a no show for her MNT visit today. Updated referring provider.   Annie Vivar, MPH, RD, CDCES, LD 10/6/2023

## 2024-05-13 ENCOUNTER — OFFICE VISIT (OUTPATIENT)
Dept: CARDIOLOGY | Facility: CLINIC | Age: 49
End: 2024-05-13
Payer: COMMERCIAL

## 2024-05-13 VITALS
BODY MASS INDEX: 30.12 KG/M2 | SYSTOLIC BLOOD PRESSURE: 124 MMHG | DIASTOLIC BLOOD PRESSURE: 81 MMHG | OXYGEN SATURATION: 97 % | HEART RATE: 83 BPM | WEIGHT: 154.2 LBS

## 2024-05-13 DIAGNOSIS — E11.9 TYPE 2 DIABETES, HBA1C GOAL < 7% (H): ICD-10-CM

## 2024-05-13 DIAGNOSIS — I25.10 CORONARY ARTERY DISEASE INVOLVING NATIVE CORONARY ARTERY OF NATIVE HEART WITHOUT ANGINA PECTORIS: ICD-10-CM

## 2024-05-13 DIAGNOSIS — Z95.5 HISTORY OF PLACEMENT OF STENT IN LAD CORONARY ARTERY: Primary | ICD-10-CM

## 2024-05-13 PROCEDURE — 99214 OFFICE O/P EST MOD 30 MIN: CPT | Performed by: INTERNAL MEDICINE

## 2024-05-13 RX ORDER — EZETIMIBE 10 MG/1
TABLET ORAL
COMMUNITY
Start: 2024-03-07

## 2024-05-13 NOTE — PROGRESS NOTES
[facility]      Referring provider: Self-referred    HPI: Ms. Lucía Lopez is a 48 year old  female with PMH significant for  -Diabetes type 2  -CAD status post LAD stent (single-vessel CAD, nonobstructive CAD in circumflex and RCA)) 4/27/2023  -Former smoker    Patient required French  ( on the phone).  Patient is establishing care with me.  She is accompanied by her .  Since she underwent LAD stent a year ago (for CP) she tells me that she has been feeling well except occasional chest pain (once a month or so) that resolve with nitroglycerin.  Otherwise she is asymptomatic from cardiac standpoint.  She follows with PCP at [facility] and she was recently started on Jardiance and Zetia.  Diabetes is not well-controlled.  Patient is compliant with medications.  She quit smoking 2 years ago.    Echocardiogram a year ago showed normal biventricular function with no significant valve disease.    Current medications: Aspirin 81, atorvastatin 40, Plavix 75, metformin, venlafaxine, jardiance and zetia.    Medications, personal, family, and social history reviewed with patient and revised.    PAST MEDICAL HISTORY:  Past Medical History:   Diagnosis Date    Coronary artery disease     Diabetes (H)     Hyperlipidemia     Obese     Shortness of breath        CURRENT MEDICATIONS:  Current Outpatient Medications   Medication Sig Dispense Refill    acetaminophen (TYLENOL) 325 MG tablet Take 325-650 mg by mouth every 6 hours as needed for mild pain      aspirin (ASA) 81 MG EC tablet Take 1 tablet (81 mg) by mouth daily 90 tablet 3    atorvastatin (LIPITOR) 40 MG tablet       cholecalciferol 50 MCG (2000 UT) CAPS Take 1 capsule by mouth daily      clopidogrel (PLAVIX) 75 MG tablet       liraglutide (VICTOZA) 18 MG/3ML solution Inject 1.2 mg Subcutaneous daily      metFORMIN (GLUCOPHAGE) 1000 MG tablet Take 1,000 mg by mouth 2 times  daily (with meals)      MULTIPLE VITAMIN PO       venlafaxine (EFFEXOR XR) 37.5 MG 24 hr capsule Take 1 capsule (37.5 mg) by mouth daily 30 capsule 6    vitamin B-Complex Take 1 tablet by mouth daily         PAST SURGICAL HISTORY:  No past surgical history on file.    ALLERGIES:   No Known Allergies    FAMILY HISTORY:  Family History   Problem Relation Age of Onset    Diabetes Mother     Coronary Artery Disease Brother     Coronary Artery Disease Brother     Coronary Artery Disease Brother     Coronary Artery Disease Brother     No Known Problems Brother     Coronary Artery Disease Sister     Coronary Artery Disease Sister     Coronary Artery Disease Sister     No Known Problems Sister     No Known Problems Sister     No Known Problems Son     No Known Problems Son     No Known Problems Son     No Known Problems Daughter          SOCIAL HISTORY:  Social History     Tobacco Use    Smoking status: Former     Types: Cigarettes    Smokeless tobacco: Never   Substance Use Topics    Alcohol use: Never    Drug use: Never       ROS:   Constitutional: No fever, chills, or sweats. Weight stable.   Cardiovascular: As per HPI.       Exam:  /81 (BP Location: Right arm, Patient Position: Sitting, Cuff Size: Adult Regular)   Pulse 83   Wt 69.9 kg (154 lb 3.2 oz)   SpO2 97%   BMI 30.12 kg/m    GENERAL APPEARANCE: alert and no distress  HEENT: no icterus, no central cyanosis  LYMPH/NECK: no adenopathy, no asymmetry, JVP not elevated, no carotid bruits.  RESPIRATORY: lungs clear to auscultation - no rales, rhonchi or wheezes, no use of accessory muscles, no retractions, respirations are unlabored, normal respiratory rate  CARDIOVASCULAR: regular rhythm, normal S1, S2, no S3 or S4 and no murmur, click or rub, precordium quiet with normal PMI.  GI: soft, non tender  EXTREMITIES: no edema  NEURO: alert, normal speech,and affect  SKIN: no ecchymoses, no rashes     I have reviewed the labs and personally reviewed the imaging  "below and made my comment in the assessment and plan.    Labs:  CBC RESULTS:   No results found for: \"WBC\", \"RBC\", \"HGB\", \"HCT\", \"MCV\", \"MCH\", \"MCHC\", \"RDW\", \"PLT\"    BMP RESULTS:  No results found for: \"NA\", \"POTASSIUM\", \"CHLORIDE\", \"CO2\", \"ANIONGAP\", \"GLC\", \"BUN\", \"CR\", \"GFRESTIMATED\", \"GFRESTBLACK\", \"SE\"     Lipids 1/2023: .  Total cholesterol 183.          Echocardiogram  No results found for this or any previous visit (from the past 8760 hour(s)).      CT coronary artery angiogram [facility] 4/13/2023 total calcium score 223.  Probable severe LAD disease.    Coronary angiogram [facility] 4/27/2023:   1. Severe 80% LAD stenosis and 90% 2nd diagonal stenosis (large caliber   vessel with [place] 1-1-1 bifurcation). DK crush PCI performed with a 2.5 x   12   mm DEREK in the 2nd diagonal and a 3.0 x 28 mm DEREK in the LAD (post-dilated   to   3.5 mm proximal to the LAD-diagonal bifurcation).     2. Non-obstructive CAD in the LCX and RCA systems.     3. Normal LVEDP.     Echocardiogram 5/13/2023:  1. Normal left ventricular chamber size and systolic function. Calculated left ventricular   ejection fraction is 63 %. No regional wall    motion abnormalities. Normal left ventricular wall thickness.    2. Normal left ventricular diastolic function.    3. Normal right ventricular size and systolic function.    4. Normal sized atria.    5. No significant valvular heart disease.    6. There were no prior studies available for comparison.    Estimated EF: 60-65%       Assessment and Plan:     # CAD status post LAD stent 4/27/2023 (single-vessel obstructive CAD) nonobstructive CAD elsewhere. Normal LV function.  -Patient is doing well with occasional chest pain that respond to sublingual nitroglycerin.   -Blood pressure is normal.   -Given uncontrolled diabetes will continue dual antiplatelet therapy for 2 years.    # Diabetes type 2  -Patient is on metformin and Jardiance.  Most recent hemoglobin A1c was 8.6    # " Hyperlipidemia  -Most recent LDL 91 on 12/2023.  Patient is currently on atorvastatin 40 mg.  2 months ago she was also started on Zetia.  She has not had a lipid check since then.  -Will order lab test for fasting lipid panel.    No medication changes today.    Return to clinic 1 year or earlier as needed.    Total time spent today for this visit is 38 minutes including precharting, face-to-face clinic visit, review of labs/imaging and medical documentation.    Araceli GONZALEZ MD  [facility] Division of Cardiology  Pager [phone]

## 2024-05-13 NOTE — PATIENT INSTRUCTIONS
Thank you for coming to the [facility] at [place]; please note the following instructions:    1. Lipid blood draw    2. Cardiology Providers: Dr. Charles Can would like you to return for a cardiac follow up in 1 year (May).  We will contact you regarding your appointment when the time draws closer or you may call [phone] option #1 to arrange an appointment. Mean while, if you should have any questions or concerns regarding your heart health, please contact us. Thank you for choosing [facility] for your care.          If you have any questions regarding your visit, please contact your care team:     CARDIOLOGY  TELEPHONE NUMBER   Celeste CASTRO., Registered Nurse  Barbie COKER, Registered Nurse  Nellie ARENAS, Registered Medical Assistant  Mona HENRY, Certified Medical Assistant  Anna JOSE, Clinic Assistant [phone] (select option 1)    *After hours: [phone]   For Scheduling Appts:     [phone] (select option [phone])    *After hours: [phone]   For the Device Clinic (Pacemakers and ICD's)  Katheryn BARRIENTOS, Registered Nurse   During business hours: [phone]    *After business hours:  [phone] (select option 4)      Normal test result notifications will be released via [facility] or mailed within 7 business days.  All other test results, will be communicated via telephone once reviewed by your cardiologist.    If you need a medication refill, please contact your pharmacy.  Please allow 3 business days for your refill to be completed.    As always, thank you for trusting us with your health care needs!

## 2024-05-13 NOTE — LETTER
5/13/2024      RE: Lucía Lopez  [address]  [place] [address]       Dear Colleague,    Thank you for the opportunity to participate in the care of your patient, Lucía Lopez, at the [facility]. Please see a copy of my visit note below.                                             General Cardiology [facility]      Referring provider: Self-referred    HPI: Ms. Lucía Lopez is a 48 year old  female with PMH significant for  -Diabetes type 2  -CAD status post LAD stent (single-vessel CAD, nonobstructive CAD in circumflex and RCA)) 4/27/2023  -Former smoker    Patient required French  ( on the phone).  Patient is establishing care with me.  She is accompanied by her .  Since she underwent LAD stent a year ago (for CP) she tells me that she has been feeling well except occasional chest pain (once a month or so) that resolve with nitroglycerin.  Otherwise she is asymptomatic from cardiac standpoint.  She follows with PCP at [facility] and she was recently started on Jardiance and Zetia.  Diabetes is not well-controlled.  Patient is compliant with medications.  She quit smoking 2 years ago.    Echocardiogram a year ago showed normal biventricular function with no significant valve disease.    Current medications: Aspirin 81, atorvastatin 40, Plavix 75, metformin, venlafaxine, jardiance and zetia.    Medications, personal, family, and social history reviewed with patient and revised.    PAST MEDICAL HISTORY:  Past Medical History:   Diagnosis Date     Coronary artery disease      Diabetes (H)      Hyperlipidemia      Obese      Shortness of breath        CURRENT MEDICATIONS:  Current Outpatient Medications   Medication Sig Dispense Refill     acetaminophen (TYLENOL) 325 MG tablet Take 325-650 mg by mouth every 6 hours as needed for mild pain       aspirin (ASA) 81 MG EC tablet Take 1  tablet (81 mg) by mouth daily 90 tablet 3     atorvastatin (LIPITOR) 40 MG tablet        cholecalciferol 50 MCG (2000 UT) CAPS Take 1 capsule by mouth daily       clopidogrel (PLAVIX) 75 MG tablet        liraglutide (VICTOZA) 18 MG/3ML solution Inject 1.2 mg Subcutaneous daily       metFORMIN (GLUCOPHAGE) 1000 MG tablet Take 1,000 mg by mouth 2 times daily (with meals)       MULTIPLE VITAMIN PO        venlafaxine (EFFEXOR XR) 37.5 MG 24 hr capsule Take 1 capsule (37.5 mg) by mouth daily 30 capsule 6     vitamin B-Complex Take 1 tablet by mouth daily         PAST SURGICAL HISTORY:  No past surgical history on file.    ALLERGIES:   No Known Allergies    FAMILY HISTORY:  Family History   Problem Relation Age of Onset     Diabetes Mother      Coronary Artery Disease Brother      Coronary Artery Disease Brother      Coronary Artery Disease Brother      Coronary Artery Disease Brother      No Known Problems Brother      Coronary Artery Disease Sister      Coronary Artery Disease Sister      Coronary Artery Disease Sister      No Known Problems Sister      No Known Problems Sister      No Known Problems Son      No Known Problems Son      No Known Problems Son      No Known Problems Daughter          SOCIAL HISTORY:  Social History     Tobacco Use     Smoking status: Former     Types: Cigarettes     Smokeless tobacco: Never   Substance Use Topics     Alcohol use: Never     Drug use: Never       ROS:   Constitutional: No fever, chills, or sweats. Weight stable.   Cardiovascular: As per HPI.       Exam:  /81 (BP Location: Right arm, Patient Position: Sitting, Cuff Size: Adult Regular)   Pulse 83   Wt 69.9 kg (154 lb 3.2 oz)   SpO2 97%   BMI 30.12 kg/m    GENERAL APPEARANCE: alert and no distress  HEENT: no icterus, no central cyanosis  LYMPH/NECK: no adenopathy, no asymmetry, JVP not elevated, no carotid bruits.  RESPIRATORY: lungs clear to auscultation - no rales, rhonchi or wheezes, no use of accessory muscles,  "no retractions, respirations are unlabored, normal respiratory rate  CARDIOVASCULAR: regular rhythm, normal S1, S2, no S3 or S4 and no murmur, click or rub, precordium quiet with normal PMI.  GI: soft, non tender  EXTREMITIES: no edema  NEURO: alert, normal speech,and affect  SKIN: no ecchymoses, no rashes     I have reviewed the labs and personally reviewed the imaging below and made my comment in the assessment and plan.    Labs:  CBC RESULTS:   No results found for: \"WBC\", \"RBC\", \"HGB\", \"HCT\", \"MCV\", \"MCH\", \"MCHC\", \"RDW\", \"PLT\"    BMP RESULTS:  No results found for: \"NA\", \"POTASSIUM\", \"CHLORIDE\", \"CO2\", \"ANIONGAP\", \"GLC\", \"BUN\", \"CR\", \"GFRESTIMATED\", \"GFRESTBLACK\", \"SE\"     Lipids 1/2023: .  Total cholesterol 183.          Echocardiogram  No results found for this or any previous visit (from the past 8760 hour(s)).      CT coronary artery angiogram [facility] 4/13/2023 total calcium score 223.  Probable severe LAD disease.    Coronary angiogram [facility] 4/27/2023:   1. Severe 80% LAD stenosis and 90% 2nd diagonal stenosis (large caliber   vessel with [place] 1-1-1 bifurcation). DK crush PCI performed with a 2.5 x   12   mm DEREK in the 2nd diagonal and a 3.0 x 28 mm DEREK in the LAD (post-dilated   to   3.5 mm proximal to the LAD-diagonal bifurcation).     2. Non-obstructive CAD in the LCX and RCA systems.     3. Normal LVEDP.     Echocardiogram 5/13/2023:  1. Normal left ventricular chamber size and systolic function. Calculated left ventricular   ejection fraction is 63 %. No regional wall    motion abnormalities. Normal left ventricular wall thickness.    2. Normal left ventricular diastolic function.    3. Normal right ventricular size and systolic function.    4. Normal sized atria.    5. No significant valvular heart disease.    6. There were no prior studies available for comparison.    Estimated EF: 60-65%       Assessment and Plan:     # CAD status post LAD stent 4/27/2023 (single-vessel " obstructive CAD) nonobstructive CAD elsewhere. Normal LV function.  -Patient is doing well with occasional chest pain that respond to sublingual nitroglycerin.   -Blood pressure is normal.   -Given uncontrolled diabetes will continue dual antiplatelet therapy for 2 years.    # Diabetes type 2  -Patient is on metformin and Jardiance.  Most recent hemoglobin A1c was 8.6    # Hyperlipidemia  -Most recent LDL 91 on 12/2023.  Patient is currently on atorvastatin 40 mg.  2 months ago she was also started on Zetia.  She has not had a lipid check since then.  -Will order lab test for fasting lipid panel.    No medication changes today.    Return to clinic 1 year or earlier as needed.    Total time spent today for this visit is 38 minutes including precharting, face-to-face clinic visit, review of labs/imaging and medical documentation.    Araceli GONZALEZ MD  [facility] Division of Cardiology  Pager [phone]       Please do not hesitate to contact me if you have any questions/concerns.     Sincerely,     Araceli Gonzalez MD

## 2024-05-13 NOTE — NURSING NOTE
Chief Complaint   Patient presents with    Follow Up     Reason for visit: Return general cardiology for CAD follow-up per patient       Initial /81 (BP Location: Right arm, Patient Position: Sitting, Cuff Size: Adult Regular)   Pulse 83   Wt 69.9 kg (154 lb 3.2 oz)   SpO2 97%   BMI 30.12 kg/m   Estimated body mass index is 30.12 kg/m  as calculated from the following:    Height as of 5/31/23: 1.524 m (5').    Weight as of this encounter: 69.9 kg (154 lb 3.2 oz)..  BP completed using cuff size: regular    JASON Bui

## 2025-02-11 ENCOUNTER — TELEPHONE (OUTPATIENT)
Dept: CARDIOLOGY | Facility: CLINIC | Age: 50
End: 2025-02-11
Payer: COMMERCIAL

## (undated) RX ORDER — METOPROLOL TARTRATE 1 MG/ML
INJECTION, SOLUTION INTRAVENOUS
Status: DISPENSED
Start: 2022-11-04

## (undated) RX ORDER — DOBUTAMINE HYDROCHLORIDE 200 MG/100ML
INJECTION INTRAVENOUS
Status: DISPENSED
Start: 2022-11-04

## (undated) RX ORDER — ATROPINE SULFATE 0.4 MG/ML
AMPUL (ML) INJECTION
Status: DISPENSED
Start: 2022-11-04